# Patient Record
Sex: FEMALE | Race: BLACK OR AFRICAN AMERICAN
[De-identification: names, ages, dates, MRNs, and addresses within clinical notes are randomized per-mention and may not be internally consistent; named-entity substitution may affect disease eponyms.]

---

## 2018-03-08 ENCOUNTER — HOSPITAL ENCOUNTER (OUTPATIENT)
Dept: HOSPITAL 92 - RAD | Age: 48
Discharge: HOME | End: 2018-03-08
Attending: SURGERY
Payer: COMMERCIAL

## 2018-03-08 DIAGNOSIS — R10.13: Primary | ICD-10-CM

## 2018-03-08 DIAGNOSIS — Z98.84: ICD-10-CM

## 2018-03-08 PROCEDURE — 82728 ASSAY OF FERRITIN: CPT

## 2018-03-08 PROCEDURE — 80061 LIPID PANEL: CPT

## 2018-03-08 PROCEDURE — 82306 VITAMIN D 25 HYDROXY: CPT

## 2018-03-08 PROCEDURE — 83540 ASSAY OF IRON: CPT

## 2018-03-08 PROCEDURE — 83036 HEMOGLOBIN GLYCOSYLATED A1C: CPT

## 2018-03-08 PROCEDURE — 82746 ASSAY OF FOLIC ACID SERUM: CPT

## 2018-03-08 PROCEDURE — 84425 ASSAY OF VITAMIN B-1: CPT

## 2018-03-08 PROCEDURE — 83970 ASSAY OF PARATHORMONE: CPT

## 2018-03-08 PROCEDURE — 36415 COLL VENOUS BLD VENIPUNCTURE: CPT

## 2018-03-08 PROCEDURE — 80053 COMPREHEN METABOLIC PANEL: CPT

## 2018-03-08 PROCEDURE — 74241: CPT

## 2018-03-08 PROCEDURE — 82607 VITAMIN B-12: CPT

## 2018-03-08 PROCEDURE — 85025 COMPLETE CBC W/AUTO DIFF WBC: CPT

## 2018-03-08 NOTE — RAD
UPPER GI:

 

History: Prior gastric sleeve procedure. Epigastric pain. 

 

FINDINGS: 

The esophagus appears unremarkable. 

 

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 Contrast flows into a gastric pouch where there is
 pooling with air fluid level. Contrast then begins to empty into the sleeve portion of the stomach w
hich is shown to have a relatively large lumen. Contrast then flows into the antrum and pylorus. Cont
rast fills the duodenum and jejunum and the duodenal and jejunal fold patterns appear normal. 

 

IMPRESSION: 

1. Patient is post gastric sleeve by history. Contrast pools in the gastric pouch and there is reflux
 from this pouch when patient is placed succumbent. Contrast then flows through the sleeve portion of
 the stomach which exhibits a relatively large lumen. 

 

POS: [facility]